# Patient Record
Sex: FEMALE | Race: WHITE | NOT HISPANIC OR LATINO | Employment: UNEMPLOYED | URBAN - METROPOLITAN AREA
[De-identification: names, ages, dates, MRNs, and addresses within clinical notes are randomized per-mention and may not be internally consistent; named-entity substitution may affect disease eponyms.]

---

## 2017-08-19 ENCOUNTER — HOSPITAL ENCOUNTER (EMERGENCY)
Facility: HOSPITAL | Age: 22
Discharge: HOME | End: 2017-08-19
Attending: EMERGENCY MEDICINE | Admitting: EMERGENCY MEDICINE

## 2017-08-19 VITALS
OXYGEN SATURATION: 99 % | TEMPERATURE: 98.7 F | WEIGHT: 130 LBS | BODY MASS INDEX: 19.7 KG/M2 | RESPIRATION RATE: 16 BRPM | HEART RATE: 90 BPM | SYSTOLIC BLOOD PRESSURE: 123 MMHG | DIASTOLIC BLOOD PRESSURE: 70 MMHG | HEIGHT: 68 IN

## 2017-08-19 DIAGNOSIS — N39.0 ACUTE UTI: Primary | ICD-10-CM

## 2017-08-19 DIAGNOSIS — N30.91 HEMORRHAGIC CYSTITIS: ICD-10-CM

## 2017-08-19 LAB
ALBUMIN SERPL-MCNC: 4.3 G/DL (ref 3.5–5.2)
ALBUMIN/GLOB SERPL: 1.5 G/DL
ALP SERPL-CCNC: 45 U/L (ref 39–117)
ALT SERPL W P-5'-P-CCNC: 14 U/L (ref 1–33)
ANION GAP SERPL CALCULATED.3IONS-SCNC: 16.9 MMOL/L
AST SERPL-CCNC: 18 U/L (ref 1–32)
B-HCG UR QL: NEGATIVE
BACTERIA UR QL AUTO: ABNORMAL /HPF
BASOPHILS # BLD AUTO: 0.02 10*3/MM3 (ref 0–0.2)
BASOPHILS NFR BLD AUTO: 0.2 % (ref 0–1.5)
BILIRUB SERPL-MCNC: 0.2 MG/DL (ref 0.1–1.2)
BILIRUB UR QL STRIP: NEGATIVE
BUN BLD-MCNC: 10 MG/DL (ref 6–20)
BUN/CREAT SERPL: 12.7 (ref 7–25)
CALCIUM SPEC-SCNC: 9 MG/DL (ref 8.6–10.5)
CHLORIDE SERPL-SCNC: 106 MMOL/L (ref 98–107)
CLARITY UR: ABNORMAL
CO2 SERPL-SCNC: 21.1 MMOL/L (ref 22–29)
COLOR UR: ABNORMAL
CREAT BLD-MCNC: 0.79 MG/DL (ref 0.57–1)
DEPRECATED RDW RBC AUTO: 40.7 FL (ref 37–54)
EOSINOPHIL # BLD AUTO: 0.18 10*3/MM3 (ref 0–0.7)
EOSINOPHIL NFR BLD AUTO: 1.8 % (ref 0.3–6.2)
ERYTHROCYTE [DISTWIDTH] IN BLOOD BY AUTOMATED COUNT: 12.7 % (ref 11.7–13)
GFR SERPL CREATININE-BSD FRML MDRD: 94 ML/MIN/1.73
GLOBULIN UR ELPH-MCNC: 2.8 GM/DL
GLUCOSE BLD-MCNC: 102 MG/DL (ref 65–99)
GLUCOSE UR STRIP-MCNC: NEGATIVE MG/DL
HCT VFR BLD AUTO: 40.4 % (ref 35.6–45.5)
HGB BLD-MCNC: 13.4 G/DL (ref 11.9–15.5)
HGB UR QL STRIP.AUTO: ABNORMAL
HYALINE CASTS UR QL AUTO: ABNORMAL /LPF
IMM GRANULOCYTES # BLD: 0.02 10*3/MM3 (ref 0–0.03)
IMM GRANULOCYTES NFR BLD: 0.2 % (ref 0–0.5)
KETONES UR QL STRIP: NEGATIVE
LEUKOCYTE ESTERASE UR QL STRIP.AUTO: ABNORMAL
LYMPHOCYTES # BLD AUTO: 1.36 10*3/MM3 (ref 0.9–4.8)
LYMPHOCYTES NFR BLD AUTO: 13.3 % (ref 19.6–45.3)
MCH RBC QN AUTO: 28.9 PG (ref 26.9–32)
MCHC RBC AUTO-ENTMCNC: 33.2 G/DL (ref 32.4–36.3)
MCV RBC AUTO: 87.3 FL (ref 80.5–98.2)
MONOCYTES # BLD AUTO: 0.82 10*3/MM3 (ref 0.2–1.2)
MONOCYTES NFR BLD AUTO: 8 % (ref 5–12)
NEUTROPHILS # BLD AUTO: 7.85 10*3/MM3 (ref 1.9–8.1)
NEUTROPHILS NFR BLD AUTO: 76.5 % (ref 42.7–76)
NITRITE UR QL STRIP: POSITIVE
PH UR STRIP.AUTO: 6.5 [PH] (ref 5–8)
PLATELET # BLD AUTO: 193 10*3/MM3 (ref 140–500)
PMV BLD AUTO: 10.2 FL (ref 6–12)
POTASSIUM BLD-SCNC: 3.6 MMOL/L (ref 3.5–5.2)
PROT SERPL-MCNC: 7.1 G/DL (ref 6–8.5)
PROT UR QL STRIP: ABNORMAL
RBC # BLD AUTO: 4.63 10*6/MM3 (ref 3.9–5.2)
RBC # UR: ABNORMAL /HPF
REF LAB TEST METHOD: ABNORMAL
SODIUM BLD-SCNC: 144 MMOL/L (ref 136–145)
SP GR UR STRIP: 1.01 (ref 1–1.03)
SQUAMOUS #/AREA URNS HPF: ABNORMAL /HPF
UROBILINOGEN UR QL STRIP: ABNORMAL
WBC NRBC COR # BLD: 10.25 10*3/MM3 (ref 4.5–10.7)
WBC UR QL AUTO: ABNORMAL /HPF

## 2017-08-19 PROCEDURE — 81025 URINE PREGNANCY TEST: CPT | Performed by: EMERGENCY MEDICINE

## 2017-08-19 PROCEDURE — 87086 URINE CULTURE/COLONY COUNT: CPT | Performed by: EMERGENCY MEDICINE

## 2017-08-19 PROCEDURE — 85025 COMPLETE CBC W/AUTO DIFF WBC: CPT | Performed by: EMERGENCY MEDICINE

## 2017-08-19 PROCEDURE — 80053 COMPREHEN METABOLIC PANEL: CPT | Performed by: EMERGENCY MEDICINE

## 2017-08-19 PROCEDURE — 81001 URINALYSIS AUTO W/SCOPE: CPT | Performed by: EMERGENCY MEDICINE

## 2017-08-19 PROCEDURE — 99284 EMERGENCY DEPT VISIT MOD MDM: CPT

## 2017-08-19 PROCEDURE — 87186 SC STD MICRODIL/AGAR DIL: CPT | Performed by: EMERGENCY MEDICINE

## 2017-08-19 RX ORDER — FEXOFENADINE HCL AND PSEUDOEPHEDRINE HCI 180; 240 MG/1; MG/1
1 TABLET, EXTENDED RELEASE ORAL DAILY
COMMUNITY
End: 2020-10-23

## 2017-08-19 RX ORDER — PHENAZOPYRIDINE HYDROCHLORIDE 100 MG/1
100 TABLET, FILM COATED ORAL 3 TIMES DAILY PRN
Qty: 6 TABLET | Refills: 0 | Status: SHIPPED | OUTPATIENT
Start: 2017-08-19 | End: 2022-11-21

## 2017-08-19 RX ORDER — SULFAMETHOXAZOLE AND TRIMETHOPRIM 800; 160 MG/1; MG/1
1 TABLET ORAL 2 TIMES DAILY
Qty: 14 TABLET | Refills: 0 | Status: SHIPPED | OUTPATIENT
Start: 2017-08-19 | End: 2022-11-21

## 2017-08-19 NOTE — ED TRIAGE NOTES
Pain/ burning with urination, small amount of blood noticed with urination, started tonight, pressure in lower abdomen

## 2017-08-19 NOTE — ED PROVIDER NOTES
" EMERGENCY DEPARTMENT ENCOUNTER    CHIEF COMPLAINT  Chief Complaint: Abdominal Pain  History given by: Patient, Friends  History limited by: N/A  Room Number: 19/19  PMD: No Known Provider      HPI:  Pt with h/o recurrent UTIs presents to the ED c/o \"pressure-like\" lower abdominal pain onset at about 02:00 AM last night. Pt reports that her pain was gradual in onset. Pt has also had nausea, urinary frequency, dysuria, and hematuria, but denies flank pain, CP, dyspnea, and documented fever. Pt reports that her current sx are similar to her prior episodes of UTIs. There are no other complaints at this time.     Pain Location: Lower abdomen  Radiation: None  Quality: \"pressure-like\"  Intensity/Severity: Moderate  Duration: Onset at about 02:00 AM last night  Onset quality: Gradual  Timing: Intermittent  Progression: Waxing and waning  Aggravating Factors: Urination  Alleviating Factors: Nothing  Previous Episodes: Multiple (pt has h/o UTIs and reports that her current sx are similar to her prior episodes of UTI)  Treatment before arrival: None  Associated Symptoms: Nausea, urinary frequency, dysuria, and hematuria       PAST MEDICAL HISTORY  Active Ambulatory Problems     Diagnosis Date Noted   • No Active Ambulatory Problems     Resolved Ambulatory Problems     Diagnosis Date Noted   • No Resolved Ambulatory Problems     No Additional Past Medical History         PAST SURGICAL HISTORY  History reviewed. No pertinent surgical history.      FAMILY HISTORY  History reviewed. No pertinent family history.      SOCIAL HISTORY  Social History     Social History   • Marital status: Single     Spouse name: N/A   • Number of children: N/A   • Years of education: N/A     Occupational History   • Not on file.     Social History Main Topics   • Smoking status: Never Smoker   • Smokeless tobacco: Not on file   • Alcohol use No   • Drug use: No   • Sexual activity: Not on file     Other Topics Concern   • Not on file     Social " History Narrative   • No narrative on file         ALLERGIES  Review of patient's allergies indicates no known allergies.        REVIEW OF SYSTEMS  Review of Systems   Constitutional: Negative for chills and fever (pt denies documented fever).   HENT: Negative for congestion, rhinorrhea and sore throat.    Eyes: Negative for pain.   Respiratory: Negative for cough and shortness of breath.    Cardiovascular: Negative for chest pain, palpitations and leg swelling.   Gastrointestinal: Positive for abdominal pain (lower abdomen) and nausea. Negative for diarrhea and vomiting.   Genitourinary: Positive for dysuria, frequency (urinary frequency) and hematuria. Negative for flank pain.   Musculoskeletal: Negative for myalgias, neck pain and neck stiffness.   Skin: Negative for rash.   Neurological: Negative for dizziness, speech difficulty, weakness, light-headedness, numbness and headaches.   Psychiatric/Behavioral: Negative.    All other systems reviewed and are negative.           PHYSICAL EXAM  ED Triage Vitals   Temp Heart Rate Resp BP SpO2   08/19/17 0540 08/19/17 0535 08/19/17 0535 08/19/17 0550 08/19/17 0535   97.5 °F (36.4 °C) 112 16 129/90 98 % WNL      Temp src Heart Rate Source Patient Position BP Location FiO2 (%)   08/19/17 0540 08/19/17 0535 -- -- --   Tympanic Monitor          Physical Exam   Constitutional: She is oriented to person, place, and time. No distress.   HENT:   Head: Normocephalic.   Mouth/Throat: Mucous membranes are normal.   Eyes: EOM are normal. Pupils are equal, round, and reactive to light.   Neck: Normal range of motion. Neck supple.   Cardiovascular: Normal rate, regular rhythm and normal heart sounds.    Pulmonary/Chest: Effort normal and breath sounds normal. No respiratory distress. She has no decreased breath sounds. She has no wheezes. She has no rhonchi. She has no rales.   Abdominal: Soft. There is tenderness (mild) in the suprapubic area. There is no rebound, no guarding and no  CVA tenderness.   Musculoskeletal: Normal range of motion.   Neurological: She is alert and oriented to person, place, and time. She has normal sensation.   Skin: Skin is warm and dry.   Psychiatric: Mood and affect normal.   Nursing note and vitals reviewed.          LAB RESULTS  Recent Results (from the past 24 hour(s))   Urinalysis With / Culture If Indicated    Collection Time: 08/19/17  6:24 AM   Result Value Ref Range    Color, UA Red (A) Yellow, Straw    Appearance, UA Cloudy (A) Clear    pH, UA 6.5 5.0 - 8.0    Specific Gravity, UA 1.015 1.005 - 1.030    Glucose, UA Negative Negative    Ketones, UA Negative Negative    Bilirubin, UA Negative Negative    Blood, UA Large (3+) (A) Negative    Protein,  mg/dL (2+) (A) Negative    Leuk Esterase, UA Large (3+) (A) Negative    Nitrite, UA Positive (A) Negative    Urobilinogen, UA 0.2 E.U./dL 0.2 - 1.0 E.U./dL   Pregnancy, Urine    Collection Time: 08/19/17  6:24 AM   Result Value Ref Range    HCG, Urine QL Negative Negative   Urinalysis, Microscopic Only    Collection Time: 08/19/17  6:24 AM   Result Value Ref Range    RBC, UA Too Numerous to Count (A) None Seen, 0-2 /HPF    WBC, UA Unable to determine due to loaded field (A) None Seen, 0-2 /HPF    Bacteria, UA Unable to determine due to loaded field (A) None Seen /HPF    Squamous Epithelial Cells, UA Unable to determine due to loaded field (A) None Seen, 0-2 /HPF    Hyaline Casts, UA Unable to determine due to loaded field None Seen /LPF    Methodology Manual Light Microscopy    Comprehensive Metabolic Panel    Collection Time: 08/19/17  6:38 AM   Result Value Ref Range    Glucose 102 (H) 65 - 99 mg/dL    BUN 10 6 - 20 mg/dL    Creatinine 0.79 0.57 - 1.00 mg/dL    Sodium 144 136 - 145 mmol/L    Potassium 3.6 3.5 - 5.2 mmol/L    Chloride 106 98 - 107 mmol/L    CO2 21.1 (L) 22.0 - 29.0 mmol/L    Calcium 9.0 8.6 - 10.5 mg/dL    Total Protein 7.1 6.0 - 8.5 g/dL    Albumin 4.30 3.50 - 5.20 g/dL    ALT (SGPT) 14 1  - 33 U/L    AST (SGOT) 18 1 - 32 U/L    Alkaline Phosphatase 45 39 - 117 U/L    Total Bilirubin 0.2 0.1 - 1.2 mg/dL    eGFR Non African Amer 94 >60 mL/min/1.73    Globulin 2.8 gm/dL    A/G Ratio 1.5 g/dL    BUN/Creatinine Ratio 12.7 7.0 - 25.0    Anion Gap 16.9 mmol/L   CBC Auto Differential    Collection Time: 08/19/17  6:39 AM   Result Value Ref Range    WBC 10.25 4.50 - 10.70 10*3/mm3    RBC 4.63 3.90 - 5.20 10*6/mm3    Hemoglobin 13.4 11.9 - 15.5 g/dL    Hematocrit 40.4 35.6 - 45.5 %    MCV 87.3 80.5 - 98.2 fL    MCH 28.9 26.9 - 32.0 pg    MCHC 33.2 32.4 - 36.3 g/dL    RDW 12.7 11.7 - 13.0 %    RDW-SD 40.7 37.0 - 54.0 fl    MPV 10.2 6.0 - 12.0 fL    Platelets 193 140 - 500 10*3/mm3    Neutrophil % 76.5 (H) 42.7 - 76.0 %    Lymphocyte % 13.3 (L) 19.6 - 45.3 %    Monocyte % 8.0 5.0 - 12.0 %    Eosinophil % 1.8 0.3 - 6.2 %    Basophil % 0.2 0.0 - 1.5 %    Immature Grans % 0.2 0.0 - 0.5 %    Neutrophils, Absolute 7.85 1.90 - 8.10 10*3/mm3    Lymphocytes, Absolute 1.36 0.90 - 4.80 10*3/mm3    Monocytes, Absolute 0.82 0.20 - 1.20 10*3/mm3    Eosinophils, Absolute 0.18 0.00 - 0.70 10*3/mm3    Basophils, Absolute 0.02 0.00 - 0.20 10*3/mm3    Immature Grans, Absolute 0.02 0.00 - 0.03 10*3/mm3       Ordered the above labs and reviewed the results.            PROCEDURES  Procedures          PROGRESS AND CONSULTS  ED Course   Comment By Time   7:26 AM  Patient with UTI.  Has frequent UTIs and has seen urologist in the remote past.  Frequency, urgency, blood in urine.  No flank pain or tenderness.  No vomiting.  Will start on bactrim.  Pyridium.  Will give PMD and urology for follow up. Magdaleno Hernandez MD 08/19 0727     7:24 AM:  Informed pt that her WBC is WNL. Hgb is 13.4. Pt was also informed of her UA results. Pt's sx are most likely c/w hemorrhagic cystitis. Will prescribe pt with rx for antibiotics and pyridium. Pt will also be provided with f/u to the urologist and [facility]. RTER warnings given. Pt agrees with plan for  discharge.                MEDICAL DECISION MAKING      MDM  Number of Diagnoses or Management Options     Amount and/or Complexity of Data Reviewed  Clinical lab tests: ordered and reviewed (WBC is 10.25. Hgb is 13.4. )    Patient Progress  Patient progress: stable             DIAGNOSIS  Final diagnoses:   Acute UTI   Hemorrhagic cystitis         DISPOSITION  Pt discharged.      DISCHARGE    Patient discharged in stable condition.    Reviewed implications of results, diagnosis, meds, responsibility to follow up, warning signs and symptoms of possible worsening, potential complications and reasons to return to ER.    Patient/Family voiced understanding of above instructions.    Discussed plan for discharge, as there is no emergent indication for admission.  Pt/family is agreeable and understands need for follow up and repeat testing.  Pt is aware that discharge does not mean that nothing is wrong but it indicates no emergency is present that requires admission and they must continue care with follow-up as given below or physician of their choice.     FOLLOW-UP  [facility] REFERRAL SERVICE  [facility] [id number]  [phone]        Damian Mckinnon MD  [address] [place] [address] [place] [id number] [phone]               Medication List      New Prescriptions          phenazopyridine 100 MG tablet   Commonly known as:  PYRIDIUM   Take 1 tablet by mouth 3 (Three) Times a Day As Needed for bladder spasms.         sulfamethoxazole-trimethoprim 800-160 MG per tablet   Commonly known as:  BACTRIM DS,SEPTRA DS   Take 1 tablet by mouth 2 (Two) Times a Day.                       Latest Documented Vital Signs:  As of 7:31 AM  BP- 140/90 HR- 93 Temp- 97.5 °F (36.4 °C) (Tympanic) O2 sat- 99%        --  Documentation assistance provided by long Gray for Dr. Mary MD.  Information recorded by the long was done at my direction and has been verified and validated by me.             Vicky Gray  08/19/17 0744       Magdaleno Hernandez MD  08/19/17 0831

## 2017-08-21 LAB — BACTERIA SPEC AEROBE CULT: ABNORMAL

## 2020-10-23 ENCOUNTER — OFFICE VISIT (OUTPATIENT)
Dept: OBSTETRICS AND GYNECOLOGY | Facility: CLINIC | Age: 25
End: 2020-10-23

## 2020-10-23 VITALS
HEIGHT: 68 IN | WEIGHT: 130.2 LBS | DIASTOLIC BLOOD PRESSURE: 72 MMHG | BODY MASS INDEX: 19.73 KG/M2 | SYSTOLIC BLOOD PRESSURE: 126 MMHG

## 2020-10-23 DIAGNOSIS — Z01.419 ENCOUNTER FOR ANNUAL ROUTINE GYNECOLOGICAL EXAMINATION: Primary | ICD-10-CM

## 2020-10-23 PROCEDURE — 99385 PREV VISIT NEW AGE 18-39: CPT | Performed by: NURSE PRACTITIONER

## 2020-10-23 RX ORDER — NORGESTIMATE AND ETHINYL ESTRADIOL 0.25-0.035
1 KIT ORAL DAILY
Qty: 84 TABLET | Refills: 4 | Status: SHIPPED | OUTPATIENT
Start: 2020-10-23 | End: 2021-11-19 | Stop reason: SDUPTHER

## 2020-10-23 RX ORDER — NORGESTIMATE AND ETHINYL ESTRADIOL 0.25-0.035
KIT ORAL DAILY
COMMUNITY
Start: 2020-09-17 | End: 2020-10-23 | Stop reason: SDUPTHER

## 2020-10-23 NOTE — PROGRESS NOTES
GYN Annual Exam     CC - Here for annual exam.        [facility]  Gabi Kaur is a 24 y.o. female, , who presents for annual well woman exam. Patient's last menstrual period was 10/13/2020..  Periods are regular every 25-35 days, lasting 5 days. .  Dysmenorrhea:moderate, occurring first 1-2 days of flow.  Patient reports problems with: none.  Partner Status: Marital Status: single.   Desires STD Screening: no.  Pt in law school at     Additional OB/GYN History   Current contraception: contraceptive methods: BCP  Desires to: continue contraception  Last Pap :   Last Completed Pap Smear       Status Date      PAP SMEAR No completions recorded        History of abnormal Pap smear: no  Family history of uterine, colon, breast, or ovarian cancer: no  Performs monthly Self-Breast Exam: no  Exercises Regularly:yes  Feelings of Anxiety or Depression: no  Tobacco Usage?: No   OB History        0    Para   0    Term   0       0    AB   0    Living   0       SAB   0    TAB   0    Ectopic   0    Molar   0    Multiple   0    Live Births   0                Health Maintenance   Topic Date Due   • Annual Gynecologic Pelvic and Breast Exam  1997   • ANNUAL PHYSICAL  2000   • HPV VACCINES (1 - 2-dose series) 2008   • TDAP/TD VACCINES (1 - Tdap) 2016   • HEPATITIS C SCREENING  2017   • CHLAMYDIA SCREENING  2017   • PAP SMEAR  2018   • INFLUENZA VACCINE  2020   • Pneumococcal Vaccine 0-64  Aged Out   • MENINGOCOCCAL VACCINE (Normal Risk)  Aged Out       The additional following portions of the patient's history were reviewed and updated as appropriate: allergies, current medications, past family history, past medical history, past social history, past surgical history and problem list.    Review of Systems   Constitutional: Negative.    Respiratory: Negative.    Cardiovascular: Negative.    Gastrointestinal: Negative.    Endocrine: Negative.    Genitourinary:  "Negative.    Musculoskeletal: Negative.    Skin: Negative.    Neurological: Negative.    Psychiatric/Behavioral: Negative.    All other systems reviewed and are negative.    All other systems reviewed and are negative.     I have reviewed and agree with the HPI, ROS, and historical information as entered above. Lou Hancock, APRN    Objective   /72 (BP Location: Right arm, Patient Position: Sitting, Cuff Size: Adult)   Ht 172.7 cm (68\")   Wt 59.1 kg (130 lb 3.2 oz)   LMP 10/13/2020   Breastfeeding No   BMI 19.80 kg/m²     Physical Exam  Vitals signs and nursing note reviewed. Exam conducted with a chaperone present.   Constitutional:       Appearance: She is well-developed.   HENT:      Head: Normocephalic and atraumatic.   Neck:      Musculoskeletal: Normal range of motion. No muscular tenderness.      Thyroid: No thyroid mass or thyromegaly.   Cardiovascular:      Rate and Rhythm: Normal rate and regular rhythm.      Heart sounds: No murmur.   Pulmonary:      Effort: Pulmonary effort is normal. No retractions.      Breath sounds: Normal breath sounds. No wheezing, rhonchi or rales.   Chest:      Chest wall: No mass or tenderness.      Breasts:         Right: Normal. No mass, nipple discharge, skin change or tenderness.         Left: Normal. No mass, nipple discharge, skin change or tenderness.   Abdominal:      Palpations: Abdomen is soft. Abdomen is not rigid. There is no mass.      Tenderness: There is no abdominal tenderness. There is no guarding.      Hernia: No hernia is present. There is no hernia in the left inguinal area.   Genitourinary:     Labia:         Right: No rash, tenderness or lesion.         Left: No rash, tenderness or lesion.       Vagina: Normal. No vaginal discharge or lesions.      Cervix: No cervical motion tenderness, discharge, lesion or cervical bleeding.      Uterus: Normal. Not enlarged, not fixed and not tender.       Adnexa:         Right: No mass or tenderness.     "      Left: No mass or tenderness.        Rectum: No external hemorrhoid.   Neurological:      Mental Status: She is alert and oriented to person, place, and time.   Psychiatric:         Behavior: Behavior normal.            Assessment and Plan    Problem List Items Addressed This Visit     None      Visit Diagnoses     Encounter for annual routine gynecological examination    -  Primary    Relevant Orders    Pap IG, Ct-Ng, Rfx HPV ASCU        Happy with GERALDO and taking as prescribed.    1. GYN annual well woman exam.   2. Encouraged use of condoms for STD prevention.  3. OCP's/Vaginal Ring - Discussed side effects of nausea, BTB, headaches, breast tenderness and slight weight gain in the first three cycles.  Understands risks of blood clots, stroke, and theoretical risk of breast cancer.  Denies family history of blood clots.  4. Reviewed monthly self breast exams.  Instructed to call with lumps, pain, or breast discharge.    5. RTC in 1 year or PRN with problems      Lou Hancock, APRN  10/23/2020

## 2020-10-28 DIAGNOSIS — Z01.419 ENCOUNTER FOR ANNUAL ROUTINE GYNECOLOGICAL EXAMINATION: ICD-10-CM

## 2021-11-19 ENCOUNTER — OFFICE VISIT (OUTPATIENT)
Dept: OBSTETRICS AND GYNECOLOGY | Facility: CLINIC | Age: 26
End: 2021-11-19

## 2021-11-19 VITALS
DIASTOLIC BLOOD PRESSURE: 64 MMHG | SYSTOLIC BLOOD PRESSURE: 112 MMHG | HEIGHT: 68 IN | BODY MASS INDEX: 19.7 KG/M2 | WEIGHT: 130 LBS

## 2021-11-19 DIAGNOSIS — Z01.419 PAP TEST, AS PART OF ROUTINE GYNECOLOGICAL EXAMINATION: Primary | ICD-10-CM

## 2021-11-19 PROCEDURE — 99395 PREV VISIT EST AGE 18-39: CPT | Performed by: NURSE PRACTITIONER

## 2021-11-19 RX ORDER — NORGESTIMATE AND ETHINYL ESTRADIOL 0.25-0.035
1 KIT ORAL DAILY
Qty: 84 TABLET | Refills: 4 | Status: SHIPPED | OUTPATIENT
Start: 2021-11-19 | End: 2022-10-24 | Stop reason: SDUPTHER

## 2021-11-19 NOTE — PROGRESS NOTES
GYN Annual Exam     CC - Here for annual exam.        [facility]  Gabi Kaur is a 26 y.o. female, , who presents for annual well woman exam. Patient's last menstrual period was 2021..  Periods are regular every 25-35 days, lasting 5 days. .  Dysmenorrhea:mild, occurring premenstrually.  Patient reports problems with: none.  There were no changes to her medical or surgical history since her last visit.. Partner Status: Marital Status: single.  She is sexually active. She has not had new partners since her last STD testing. She does desire STD testing. . She has had her HPV vaccines.     Additional OB/GYN History   Current contraception: contraceptive methods: OCP (estrogen/progesterone)  Desires to: continue contraception  Last Pap :   Last Completed Pap Smear     This patient has no relevant Health Maintenance data.        History of abnormal Pap smear: no  Family history of uterine, colon, breast, or ovarian cancer: no  Performs monthly Self-Breast Exam: yes  Exercises Regularly:yes  Feelings of Anxiety or Depression: no  Tobacco Usage?: No   OB History        0    Para   0    Term   0       0    AB   0    Living   0       SAB   0    IAB   0    Ectopic   0    Molar   0    Multiple   0    Live Births   0                Health Maintenance   Topic Date Due   • ANNUAL PHYSICAL  Never done   • HPV VACCINES (1 - 2-dose series) Never done   • HEPATITIS C SCREENING  Never done   • TDAP/TD VACCINES (2 - Td or Tdap) 2019   • INFLUENZA VACCINE  2021   • PAP SMEAR  10/23/2021   • Annual Gynecologic Pelvic and Breast Exam  10/24/2021   • CHLAMYDIA SCREENING  10/30/2022   • COVID-19 Vaccine  Completed   • Pneumococcal Vaccine 0-64  Aged Out       The additional following portions of the patient's history were reviewed and updated as appropriate: allergies, current medications, past family history, past medical history, past social history and past surgical history.    Review of Systems  "  Constitutional: Negative.    Respiratory: Negative.    Cardiovascular: Negative.    Gastrointestinal: Negative.    Endocrine: Negative.    Genitourinary: Negative.    Neurological: Negative.    Psychiatric/Behavioral: Negative.          I have reviewed and agree with the HPI, ROS, and historical information as entered above. Lou Hancock, APRN    Objective   /64   Ht 172.7 cm (68\")   Wt 59 kg (130 lb)   LMP 11/09/2021   BMI 19.77 kg/m²     Physical Exam  Vitals and nursing note reviewed. Exam conducted with a chaperone present.   Constitutional:       Appearance: She is well-developed.   HENT:      Head: Normocephalic and atraumatic.   Neck:      Thyroid: No thyroid mass or thyromegaly.   Cardiovascular:      Rate and Rhythm: Normal rate and regular rhythm.      Heart sounds: No murmur heard.      Pulmonary:      Effort: Pulmonary effort is normal. No retractions.      Breath sounds: Normal breath sounds. No wheezing, rhonchi or rales.   Chest:      Chest wall: No mass or tenderness.   Breasts:      Right: Normal. No mass, nipple discharge, skin change or tenderness.      Left: Normal. No mass, nipple discharge, skin change or tenderness.       Abdominal:      Palpations: Abdomen is soft. Abdomen is not rigid. There is no mass.      Tenderness: There is no abdominal tenderness. There is no guarding.      Hernia: No hernia is present. There is no hernia in the left inguinal area.   Genitourinary:     Labia:         Right: No rash, tenderness or lesion.         Left: No rash, tenderness or lesion.       Vagina: Normal. No vaginal discharge or lesions.      Cervix: No cervical motion tenderness, discharge, lesion or cervical bleeding.      Uterus: Normal. Not enlarged, not fixed and not tender.       Adnexa:         Right: No mass or tenderness.          Left: No mass or tenderness.        Rectum: No external hemorrhoid.   Musculoskeletal:      Cervical back: Normal range of motion. No muscular " tenderness.   Neurological:      Mental Status: She is alert and oriented to person, place, and time.   Psychiatric:         Behavior: Behavior normal.            Assessment and Plan    Problem List Items Addressed This Visit     None      Visit Diagnoses     Pap test, as part of routine gynecological examination    -  Primary    Relevant Orders    Pap IG, Ct-Ng, Rfx HPV ASCU        Happy on GERALDO, taking as prescribed. In [employer] school at     1. GYN annual well woman exam.   2. Encouraged use of condoms for STD prevention.  3. OCP's/Vaginal Ring - Discussed side effects of nausea, BTB, headaches, breast tenderness and slight weight gain in the first three cycles.  Understands risks of blood clots, stroke, and theoretical risk of breast cancer.  Denies family history of blood clots.  4. Reviewed monthly self breast exams.  Instructed to call with lumps, pain, or breast discharge.    5. RTC in 1 year or PRN with problems  Return in about 1 year (around 11/19/2022) for Annual physical.      Lou Hancock, APRN  11/19/2021

## 2021-11-30 DIAGNOSIS — Z01.419 PAP TEST, AS PART OF ROUTINE GYNECOLOGICAL EXAMINATION: ICD-10-CM

## 2022-10-24 ENCOUNTER — TELEPHONE (OUTPATIENT)
Dept: OBSTETRICS AND GYNECOLOGY | Facility: CLINIC | Age: 27
End: 2022-10-24

## 2022-10-24 DIAGNOSIS — Z30.41 ENCOUNTER FOR SURVEILLANCE OF CONTRACEPTIVE PILLS: Primary | ICD-10-CM

## 2022-10-24 RX ORDER — NORGESTIMATE AND ETHINYL ESTRADIOL 0.25-0.035
1 KIT ORAL DAILY
Qty: 84 TABLET | Refills: 0 | Status: SHIPPED | OUTPATIENT
Start: 2022-10-24 | End: 2022-11-21 | Stop reason: SDUPTHER

## 2022-11-21 ENCOUNTER — OFFICE VISIT (OUTPATIENT)
Dept: OBSTETRICS AND GYNECOLOGY | Facility: CLINIC | Age: 27
End: 2022-11-21

## 2022-11-21 VITALS
SYSTOLIC BLOOD PRESSURE: 118 MMHG | WEIGHT: 134.6 LBS | DIASTOLIC BLOOD PRESSURE: 72 MMHG | HEIGHT: 68 IN | BODY MASS INDEX: 20.4 KG/M2

## 2022-11-21 DIAGNOSIS — Z01.419 ROUTINE GYNECOLOGICAL EXAMINATION: Primary | ICD-10-CM

## 2022-11-21 DIAGNOSIS — N30.90 RECURRENT CYSTITIS: ICD-10-CM

## 2022-11-21 DIAGNOSIS — Z30.41 ENCOUNTER FOR SURVEILLANCE OF CONTRACEPTIVE PILLS: ICD-10-CM

## 2022-11-21 PROCEDURE — 99395 PREV VISIT EST AGE 18-39: CPT | Performed by: NURSE PRACTITIONER

## 2022-11-21 RX ORDER — NITROFURANTOIN 25; 75 MG/1; MG/1
CAPSULE ORAL
Qty: 30 CAPSULE | Refills: 3 | Status: SHIPPED | OUTPATIENT
Start: 2022-11-21

## 2022-11-21 RX ORDER — NORGESTIMATE AND ETHINYL ESTRADIOL 0.25-0.035
1 KIT ORAL DAILY
Qty: 84 TABLET | Refills: 3 | Status: SHIPPED | OUTPATIENT
Start: 2022-11-21 | End: 2023-01-17

## 2022-11-21 NOTE — PROGRESS NOTES
Gynecologic Annual Exam Note        Gynecologic Exam (annual)        Subjective     HPI  Gabi Kaur is a 27 y.o.  female who presents for annual well woman exam as a established patient of practice who is new to me. There were no changes to her medical or surgical history since her last visit.. Patient reports problems with: none. Patient's last menstrual period was 2022.. Her periods are regular every 25-35 days, lasting 4 days. The flow is moderate. Dysmenorrhea:mild, occurring premenstrually. .     Partner Status: Marital Status: co-habitating.  She is sexually active. She has not had new partners. STD testing recommendations have been explained to the patient and she does not desire STD testing. She states she has UTI's 2-3x a year, seems to be related to IC. Pt states she does void after IC, wears cotton underwear, washes after IC and UTI's are still occurring.     Additional OB/GYN History   Current contraception: contraceptive methods: OCP (estrogen/progesterone)  Desires to: continue contraception  Thromboembolic Disease: none  Age of menarche: 15    History of STD: no    Last Pap : 2021. Results: negative. HPV: not done  Last Completed Pap Smear     This patient has no relevant Health Maintenance data.           History of abnormal Pap smear: no  Gardasil status:completed  Family history of uterine, colon, breast, or ovarian cancer: no  Performs monthly Self-Breast Exam: no  Exercises Regularly:yes  Feelings of Anxiety or Depression: no  Tobacco Usage?: No       Current Outpatient Medications:   •  Estarylla 0.25-35 MG-MCG per tablet, Take 1 tablet by mouth Daily., Disp: 84 tablet, Rfl: 3  •  nitrofurantoin, macrocrystal-monohydrate, (Macrobid) 100 MG capsule, Take 1 tablet after intercourse., Disp: 30 capsule, Rfl: 3  •  NON FORMULARY, Birth control, Disp: , Rfl:      Patient is requesting refills of estarylla.    OB History        0    Para   0    Term   0      "  0    AB   0    Living   0       SAB   0    IAB   0    Ectopic   0    Molar   0    Multiple   0    Live Births   0                Health Maintenance   Topic Date Due   • ANNUAL PHYSICAL  Never done   • HPV VACCINES (1 - 2-dose series) Never done   • HEPATITIS C SCREENING  Never done   • TDAP/TD VACCINES (2 - Td or Tdap) 07/22/2019   • COVID-19 Vaccine (4 - Booster for Pfizer series) 02/16/2022   • INFLUENZA VACCINE  08/01/2022   • PAP SMEAR  11/19/2022   • Annual Gynecologic Pelvic and Breast Exam  11/20/2022   • Pneumococcal Vaccine 0-64  Aged Out   • CHLAMYDIA SCREENING  Discontinued       Past Medical History:   Diagnosis Date   • Frequent UTI         Past Surgical History:   Procedure Laterality Date   • MOLE REMOVAL         The additional following portions of the patient's history were reviewed and updated as appropriate: allergies, current medications, past family history, past medical history, past social history and past surgical history.    Review of Systems   Constitutional: Negative.    HENT: Negative.    Eyes: Negative.    Respiratory: Negative.    Cardiovascular: Negative.    Gastrointestinal: Negative.    Endocrine: Negative.    Genitourinary: Negative.    Musculoskeletal: Negative.    Allergic/Immunologic: Negative.    Neurological: Negative.    Hematological: Negative.    Psychiatric/Behavioral: Negative.          I have reviewed and agree with the HPI, ROS, and historical information as entered above. Cj Bajwa, APRN        Objective   /72   Ht 172.7 cm (68\")   Wt 61.1 kg (134 lb 9.6 oz)   LMP 11/07/2022   BMI 20.47 kg/m²     Physical Exam  Vitals and nursing note reviewed. Exam conducted with a chaperone present.   Constitutional:       Appearance: Normal appearance. She is well-developed and normal weight.   HENT:      Head: Normocephalic and atraumatic.   Neck:      Thyroid: No thyroid mass or thyromegaly.   Cardiovascular:      Rate and Rhythm: Normal rate and regular " rhythm.      Heart sounds: No murmur heard.  Pulmonary:      Effort: Pulmonary effort is normal. No retractions.      Breath sounds: Normal breath sounds. No wheezing, rhonchi or rales.   Chest:      Chest wall: No mass or tenderness.   Breasts:     Right: Normal. No mass, nipple discharge, skin change or tenderness.      Left: Normal. No mass, nipple discharge, skin change or tenderness.   Abdominal:      Palpations: Abdomen is soft. Abdomen is not rigid. There is no mass.      Tenderness: There is no abdominal tenderness. There is no guarding.      Hernia: No hernia is present.   Genitourinary:     General: Normal vulva.      Exam position: Lithotomy position.      Labia:         Right: No rash, tenderness or lesion.         Left: No rash, tenderness or lesion.       Vagina: Normal. No vaginal discharge or lesions.      Cervix: No cervical motion tenderness, discharge, lesion or cervical bleeding.      Uterus: Normal. Not enlarged, not fixed and not tender.       Adnexa: Right adnexa normal and left adnexa normal.        Right: No mass or tenderness.          Left: No mass or tenderness.        Rectum: Normal. No external hemorrhoid.   Musculoskeletal:      Cervical back: Normal range of motion. No muscular tenderness.   Neurological:      Mental Status: She is alert and oriented to person, place, and time.   Psychiatric:         Behavior: Behavior normal.            Assessment and Plan    Problem List Items Addressed This Visit    None  Visit Diagnoses     Routine gynecological examination    -  Primary    Encounter for surveillance of contraceptive pills        Relevant Medications    Estarylla 0.25-35 MG-MCG per tablet    Recurrent cystitis        Relevant Medications    nitrofurantoin, macrocrystal-monohydrate, (Macrobid) 100 MG capsule          1. GYN annual well woman exam.   2. Doing well on current ocp. Erx refilled  3. Macrobid Erx sent for recurrent cystitis r/t intercourse. Vaginal hygiene reviewed. Pt.  V/u.  4. Reviewed pap guidelines.   5. Reviewed monthly self breast exams.  Instructed to call with lumps, pain, or breast discharge.    6. Reviewed exercise as a preventative health measures.   7. Reccommended Flu Vaccine in Fall of each year.  8. RTC in 1 year or PRN with problems      Cj Bajwa, APRN  11/21/2022

## 2022-11-23 LAB — REF LAB TEST METHOD: NORMAL

## 2023-01-17 DIAGNOSIS — Z30.41 ENCOUNTER FOR SURVEILLANCE OF CONTRACEPTIVE PILLS: ICD-10-CM

## 2023-01-17 RX ORDER — NORGESTIMATE AND ETHINYL ESTRADIOL 0.25-0.035
1 KIT ORAL DAILY
Qty: 84 TABLET | Refills: 3 | Status: SHIPPED | OUTPATIENT
Start: 2023-01-17